# Patient Record
Sex: FEMALE | Race: OTHER | NOT HISPANIC OR LATINO | URBAN - METROPOLITAN AREA
[De-identification: names, ages, dates, MRNs, and addresses within clinical notes are randomized per-mention and may not be internally consistent; named-entity substitution may affect disease eponyms.]

---

## 2017-06-09 ENCOUNTER — OUTPATIENT (OUTPATIENT)
Dept: OUTPATIENT SERVICES | Facility: HOSPITAL | Age: 42
LOS: 1 days | End: 2017-06-09
Payer: COMMERCIAL

## 2017-06-09 ENCOUNTER — APPOINTMENT (OUTPATIENT)
Dept: ULTRASOUND IMAGING | Facility: IMAGING CENTER | Age: 42
End: 2017-06-09

## 2017-06-09 DIAGNOSIS — Z98.51 TUBAL LIGATION STATUS: Chronic | ICD-10-CM

## 2017-06-09 DIAGNOSIS — Z00.8 ENCOUNTER FOR OTHER GENERAL EXAMINATION: ICD-10-CM

## 2017-06-09 PROCEDURE — 76830 TRANSVAGINAL US NON-OB: CPT

## 2018-10-03 ENCOUNTER — RESULT REVIEW (OUTPATIENT)
Age: 43
End: 2018-10-03

## 2018-10-10 ENCOUNTER — OUTPATIENT (OUTPATIENT)
Dept: OUTPATIENT SERVICES | Facility: HOSPITAL | Age: 43
LOS: 1 days | End: 2018-10-10
Payer: COMMERCIAL

## 2018-10-10 ENCOUNTER — APPOINTMENT (OUTPATIENT)
Dept: MAMMOGRAPHY | Facility: IMAGING CENTER | Age: 43
End: 2018-10-10
Payer: COMMERCIAL

## 2018-10-10 DIAGNOSIS — Z98.51 TUBAL LIGATION STATUS: Chronic | ICD-10-CM

## 2018-10-10 DIAGNOSIS — Z00.8 ENCOUNTER FOR OTHER GENERAL EXAMINATION: ICD-10-CM

## 2018-10-10 PROCEDURE — 77063 BREAST TOMOSYNTHESIS BI: CPT

## 2018-10-10 PROCEDURE — 77067 SCR MAMMO BI INCL CAD: CPT

## 2018-10-10 PROCEDURE — 77063 BREAST TOMOSYNTHESIS BI: CPT | Mod: 26

## 2018-10-10 PROCEDURE — 77067 SCR MAMMO BI INCL CAD: CPT | Mod: 26

## 2019-11-27 ENCOUNTER — RESULT REVIEW (OUTPATIENT)
Age: 44
End: 2019-11-27

## 2019-12-26 ENCOUNTER — APPOINTMENT (OUTPATIENT)
Dept: ULTRASOUND IMAGING | Facility: IMAGING CENTER | Age: 44
End: 2019-12-26
Payer: COMMERCIAL

## 2019-12-26 ENCOUNTER — OUTPATIENT (OUTPATIENT)
Dept: OUTPATIENT SERVICES | Facility: HOSPITAL | Age: 44
LOS: 1 days | End: 2019-12-26
Payer: COMMERCIAL

## 2019-12-26 ENCOUNTER — APPOINTMENT (OUTPATIENT)
Dept: MAMMOGRAPHY | Facility: IMAGING CENTER | Age: 44
End: 2019-12-26
Payer: COMMERCIAL

## 2019-12-26 DIAGNOSIS — Z00.8 ENCOUNTER FOR OTHER GENERAL EXAMINATION: ICD-10-CM

## 2019-12-26 DIAGNOSIS — Z98.51 TUBAL LIGATION STATUS: Chronic | ICD-10-CM

## 2019-12-26 PROCEDURE — 77067 SCR MAMMO BI INCL CAD: CPT | Mod: 26

## 2019-12-26 PROCEDURE — 77063 BREAST TOMOSYNTHESIS BI: CPT | Mod: 26

## 2019-12-26 PROCEDURE — 77063 BREAST TOMOSYNTHESIS BI: CPT

## 2019-12-26 PROCEDURE — 76641 ULTRASOUND BREAST COMPLETE: CPT | Mod: 26,50

## 2019-12-26 PROCEDURE — 76641 ULTRASOUND BREAST COMPLETE: CPT

## 2019-12-26 PROCEDURE — 77067 SCR MAMMO BI INCL CAD: CPT

## 2020-06-29 ENCOUNTER — APPOINTMENT (OUTPATIENT)
Dept: ULTRASOUND IMAGING | Facility: IMAGING CENTER | Age: 45
End: 2020-06-29

## 2020-07-09 ENCOUNTER — RESULT REVIEW (OUTPATIENT)
Age: 45
End: 2020-07-09

## 2020-07-09 ENCOUNTER — OUTPATIENT (OUTPATIENT)
Dept: OUTPATIENT SERVICES | Facility: HOSPITAL | Age: 45
LOS: 1 days | End: 2020-07-09
Payer: COMMERCIAL

## 2020-07-09 ENCOUNTER — APPOINTMENT (OUTPATIENT)
Dept: ULTRASOUND IMAGING | Facility: IMAGING CENTER | Age: 45
End: 2020-07-09
Payer: COMMERCIAL

## 2020-07-09 DIAGNOSIS — Z98.51 TUBAL LIGATION STATUS: Chronic | ICD-10-CM

## 2020-07-09 DIAGNOSIS — Z00.8 ENCOUNTER FOR OTHER GENERAL EXAMINATION: ICD-10-CM

## 2020-07-09 PROCEDURE — 76642 ULTRASOUND BREAST LIMITED: CPT | Mod: 26,RT

## 2020-07-09 PROCEDURE — 76642 ULTRASOUND BREAST LIMITED: CPT

## 2021-03-12 ENCOUNTER — RESULT REVIEW (OUTPATIENT)
Age: 46
End: 2021-03-12

## 2021-03-12 ENCOUNTER — APPOINTMENT (OUTPATIENT)
Dept: MAMMOGRAPHY | Facility: IMAGING CENTER | Age: 46
End: 2021-03-12
Payer: COMMERCIAL

## 2021-03-12 ENCOUNTER — TRANSCRIPTION ENCOUNTER (OUTPATIENT)
Age: 46
End: 2021-03-12

## 2021-03-12 ENCOUNTER — OUTPATIENT (OUTPATIENT)
Dept: OUTPATIENT SERVICES | Facility: HOSPITAL | Age: 46
LOS: 1 days | End: 2021-03-12
Payer: COMMERCIAL

## 2021-03-12 DIAGNOSIS — Z98.51 TUBAL LIGATION STATUS: Chronic | ICD-10-CM

## 2021-03-12 DIAGNOSIS — Z00.8 ENCOUNTER FOR OTHER GENERAL EXAMINATION: ICD-10-CM

## 2021-03-12 PROCEDURE — 77067 SCR MAMMO BI INCL CAD: CPT

## 2021-03-12 PROCEDURE — 77063 BREAST TOMOSYNTHESIS BI: CPT

## 2021-03-12 PROCEDURE — 77063 BREAST TOMOSYNTHESIS BI: CPT | Mod: 26

## 2021-03-12 PROCEDURE — 77067 SCR MAMMO BI INCL CAD: CPT | Mod: 26

## 2021-06-09 ENCOUNTER — APPOINTMENT (OUTPATIENT)
Dept: MRI IMAGING | Facility: IMAGING CENTER | Age: 46
End: 2021-06-09

## 2022-02-28 ENCOUNTER — EMERGENCY (EMERGENCY)
Facility: HOSPITAL | Age: 47
LOS: 1 days | Discharge: HOME | End: 2022-02-28
Attending: EMERGENCY MEDICINE | Admitting: EMERGENCY MEDICINE
Payer: COMMERCIAL

## 2022-02-28 VITALS
RESPIRATION RATE: 18 BRPM | HEART RATE: 126 BPM | TEMPERATURE: 98 F | OXYGEN SATURATION: 100 % | SYSTOLIC BLOOD PRESSURE: 138 MMHG | HEIGHT: 63 IN | DIASTOLIC BLOOD PRESSURE: 81 MMHG

## 2022-02-28 VITALS
RESPIRATION RATE: 18 BRPM | SYSTOLIC BLOOD PRESSURE: 130 MMHG | DIASTOLIC BLOOD PRESSURE: 83 MMHG | OXYGEN SATURATION: 100 % | HEART RATE: 104 BPM

## 2022-02-28 DIAGNOSIS — Z98.51 TUBAL LIGATION STATUS: Chronic | ICD-10-CM

## 2022-02-28 LAB
ALBUMIN SERPL ELPH-MCNC: 4.3 G/DL — SIGNIFICANT CHANGE UP (ref 3.3–5)
ALP SERPL-CCNC: 117 U/L — SIGNIFICANT CHANGE UP (ref 40–120)
ALT FLD-CCNC: 17 U/L — SIGNIFICANT CHANGE UP (ref 4–33)
ANION GAP SERPL CALC-SCNC: 10 MMOL/L — SIGNIFICANT CHANGE UP (ref 7–14)
AST SERPL-CCNC: 23 U/L — SIGNIFICANT CHANGE UP (ref 4–32)
BASOPHILS # BLD AUTO: 0.03 K/UL — SIGNIFICANT CHANGE UP (ref 0–0.2)
BASOPHILS NFR BLD AUTO: 0.6 % — SIGNIFICANT CHANGE UP (ref 0–2)
BILIRUB SERPL-MCNC: 0.2 MG/DL — SIGNIFICANT CHANGE UP (ref 0.2–1.2)
BLD GP AB SCN SERPL QL: NEGATIVE — SIGNIFICANT CHANGE UP
BUN SERPL-MCNC: 18 MG/DL — SIGNIFICANT CHANGE UP (ref 7–23)
CALCIUM SERPL-MCNC: 9.1 MG/DL — SIGNIFICANT CHANGE UP (ref 8.4–10.5)
CHLORIDE SERPL-SCNC: 103 MMOL/L — SIGNIFICANT CHANGE UP (ref 98–107)
CO2 SERPL-SCNC: 24 MMOL/L — SIGNIFICANT CHANGE UP (ref 22–31)
CREAT SERPL-MCNC: 0.65 MG/DL — SIGNIFICANT CHANGE UP (ref 0.5–1.3)
EGFR: 111 ML/MIN/1.73M2 — SIGNIFICANT CHANGE UP
EOSINOPHIL # BLD AUTO: 0.06 K/UL — SIGNIFICANT CHANGE UP (ref 0–0.5)
EOSINOPHIL NFR BLD AUTO: 1.3 % — SIGNIFICANT CHANGE UP (ref 0–6)
GLUCOSE SERPL-MCNC: 94 MG/DL — SIGNIFICANT CHANGE UP (ref 70–99)
HCT VFR BLD CALC: 24.5 % — LOW (ref 34.5–45)
HGB BLD-MCNC: 8.1 G/DL — LOW (ref 11.5–15.5)
IANC: 3.12 K/UL — SIGNIFICANT CHANGE UP (ref 1.5–8.5)
IMM GRANULOCYTES NFR BLD AUTO: 0.8 % — SIGNIFICANT CHANGE UP (ref 0–1.5)
LYMPHOCYTES # BLD AUTO: 1.18 K/UL — SIGNIFICANT CHANGE UP (ref 1–3.3)
LYMPHOCYTES # BLD AUTO: 24.9 % — SIGNIFICANT CHANGE UP (ref 13–44)
MCHC RBC-ENTMCNC: 28.9 PG — SIGNIFICANT CHANGE UP (ref 27–34)
MCHC RBC-ENTMCNC: 33.1 GM/DL — SIGNIFICANT CHANGE UP (ref 32–36)
MCV RBC AUTO: 87.5 FL — SIGNIFICANT CHANGE UP (ref 80–100)
MONOCYTES # BLD AUTO: 0.31 K/UL — SIGNIFICANT CHANGE UP (ref 0–0.9)
MONOCYTES NFR BLD AUTO: 6.5 % — SIGNIFICANT CHANGE UP (ref 2–14)
NEUTROPHILS # BLD AUTO: 3.12 K/UL — SIGNIFICANT CHANGE UP (ref 1.8–7.4)
NEUTROPHILS NFR BLD AUTO: 65.9 % — SIGNIFICANT CHANGE UP (ref 43–77)
NRBC # BLD: 0 /100 WBCS — SIGNIFICANT CHANGE UP
NRBC # FLD: 0 K/UL — SIGNIFICANT CHANGE UP
PLATELET # BLD AUTO: 252 K/UL — SIGNIFICANT CHANGE UP (ref 150–400)
POTASSIUM SERPL-MCNC: 3.7 MMOL/L — SIGNIFICANT CHANGE UP (ref 3.5–5.3)
POTASSIUM SERPL-SCNC: 3.7 MMOL/L — SIGNIFICANT CHANGE UP (ref 3.5–5.3)
PROT SERPL-MCNC: 7.5 G/DL — SIGNIFICANT CHANGE UP (ref 6–8.3)
RBC # BLD: 2.8 M/UL — LOW (ref 3.8–5.2)
RBC # FLD: 13.9 % — SIGNIFICANT CHANGE UP (ref 10.3–14.5)
RH IG SCN BLD-IMP: NEGATIVE — SIGNIFICANT CHANGE UP
SODIUM SERPL-SCNC: 137 MMOL/L — SIGNIFICANT CHANGE UP (ref 135–145)
WBC # BLD: 4.74 K/UL — SIGNIFICANT CHANGE UP (ref 3.8–10.5)
WBC # FLD AUTO: 4.74 K/UL — SIGNIFICANT CHANGE UP (ref 3.8–10.5)

## 2022-02-28 PROCEDURE — 99285 EMERGENCY DEPT VISIT HI MDM: CPT

## 2022-02-28 PROCEDURE — 76830 TRANSVAGINAL US NON-OB: CPT | Mod: 26

## 2022-02-28 NOTE — ED ADULT NURSE NOTE - OBJECTIVE STATEMENT
ER pt AOX 3 ambulatory to Er coming with long menses pt stated had vag. bleeding since her LMP 2/14/22 denies CP, dizziness, no SOB, no ABD cramping, pt stated never had blood transfusions in the plast, IV to right AC 20g angio cath place, labs sent, pending U/S.     Shira Carreno RN

## 2022-02-28 NOTE — ED PROVIDER NOTE - NSICDXPASTSURGICALHX_GEN_ALL_CORE_FT
PAST SURGICAL HISTORY:   delivery delivered 2007    S/P tubal ligation bilateral tubal ligation

## 2022-02-28 NOTE — ED PROVIDER NOTE - NSICDXFAMILYHX_GEN_ALL_CORE_FT
FAMILY HISTORY:  Mother  Still living? Yes, Estimated age: Age Unknown  Family history of breast cancer, Age at diagnosis: Age Unknown  Family history of diabetes mellitus, Age at diagnosis: Age Unknown

## 2022-02-28 NOTE — ED PROVIDER NOTE - OBJECTIVE STATEMENT
47 y/o F with PMH of Fe deficiency anemia presents with heavy vaginal bleeding with clots x 15d. Endorses heavy bleeding the past 48 hours, but notes it had subsided this morning. Pt's LMP was 2/14 and is typically regular and lasts 5 days, with 1st 2 days heavy and next 3 light. Pt reports she had a hx of similar complaint of vaginal bleeding in 2016, had hysteroscopy with D&C, notes no abnormalities found. Denies hx of transfusions. Also reports fatigue which is typical for her d/t anemia. Pt denies fever, chills, skin changes, CP, SOB, nausea, vomiting, diarrhea, abdominal pain, dysuria, or vaginal discharge.

## 2022-02-28 NOTE — ED PROVIDER NOTE - NSFOLLOWUPINSTRUCTIONS_ED_ALL_ED_FT
Rest, stay hydrated, take all meds as previously prescribed, Followup with your OBGYN within 2 days for post hospital visit. Show your doctor and specialists all copies of labs given to you. Return for worsening symptoms, ex. fever, shortness of breath, chest pain, dizziness, palpitations, excessive vaginal bleeding (soaking 1 pad/hr) etc. Please read all the patient handouts.

## 2022-02-28 NOTE — ED PROVIDER NOTE - ATTENDING CONTRIBUTION TO CARE
agree with PA note    "45 y/o F with PMH of Fe deficiency anemia presents with heavy vaginal bleeding with clots x 15d. Endorses heavy bleeding the past 48 hours, but notes it had subsided this morning. Pt's LMP was 2/14 and is typically regular and lasts 5 days, with 1st 2 days heavy and next 3 light. Pt reports she had a hx of similar complaint of vaginal bleeding in 2016, had hysteroscopy with D&C, notes no abnormalities found. Denies hx of transfusions. Also reports fatigue which is typical for her d/t anemia. Pt denies fever, chills, skin changes, CP, SOB, nausea, vomiting, diarrhea, abdominal pain, dysuria, or vaginal discharge."    PE:  well appearing; VSS; CTAB/L; s1 s2 no m/r/g abd soft/NT/ND ext: no edema GYN: minimal bleeding    Imp: DUB, labs, type, and US, reassess

## 2022-02-28 NOTE — ED PROVIDER NOTE - NSICDXPASTMEDICALHX_GEN_ALL_CORE_FT
PAST MEDICAL HISTORY:  Anemia     Heavy menstrual bleeding     History of  delivery x3    Short cervix affecting pregnancy treated with progesterone until week 34 of this pregnancy    Tubal ligation status bilateral tubal ligation 2014

## 2022-02-28 NOTE — ED PROVIDER NOTE - PATIENT PORTAL LINK FT
You can access the FollowMyHealth Patient Portal offered by [facility] by registering at the following website: [email or web address]. By joining [employer]’s FollowMyHealth portal, you will also be able to view your health information using other applications (apps) compatible with our system.

## 2022-04-29 ENCOUNTER — RESULT REVIEW (OUTPATIENT)
Age: 47
End: 2022-04-29

## 2022-05-06 ENCOUNTER — EMERGENCY (EMERGENCY)
Facility: HOSPITAL | Age: 47
LOS: 1 days | Discharge: HOME | End: 2022-05-06
Attending: EMERGENCY MEDICINE | Admitting: EMERGENCY MEDICINE
Payer: COMMERCIAL

## 2022-05-06 VITALS
DIASTOLIC BLOOD PRESSURE: 87 MMHG | HEART RATE: 107 BPM | TEMPERATURE: 98 F | OXYGEN SATURATION: 100 % | HEIGHT: 63 IN | SYSTOLIC BLOOD PRESSURE: 151 MMHG | RESPIRATION RATE: 16 BRPM

## 2022-05-06 VITALS
DIASTOLIC BLOOD PRESSURE: 63 MMHG | TEMPERATURE: 98 F | SYSTOLIC BLOOD PRESSURE: 120 MMHG | HEART RATE: 83 BPM | OXYGEN SATURATION: 100 % | RESPIRATION RATE: 17 BRPM

## 2022-05-06 DIAGNOSIS — Z98.51 TUBAL LIGATION STATUS: Chronic | ICD-10-CM

## 2022-05-06 PROCEDURE — G1004: CPT

## 2022-05-06 PROCEDURE — 73562 X-RAY EXAM OF KNEE 3: CPT | Mod: 26,LT

## 2022-05-06 PROCEDURE — 99285 EMERGENCY DEPT VISIT HI MDM: CPT

## 2022-05-06 PROCEDURE — 73590 X-RAY EXAM OF LOWER LEG: CPT | Mod: 26,LT

## 2022-05-06 PROCEDURE — 72125 CT NECK SPINE W/O DYE: CPT | Mod: 26,MG

## 2022-05-06 RX ORDER — ACETAMINOPHEN 500 MG
650 TABLET ORAL ONCE
Refills: 0 | Status: COMPLETED | OUTPATIENT
Start: 2022-05-06 | End: 2022-05-06

## 2022-05-06 RX ORDER — CYCLOBENZAPRINE HYDROCHLORIDE 10 MG/1
5 TABLET, FILM COATED ORAL ONCE
Refills: 0 | Status: COMPLETED | OUTPATIENT
Start: 2022-05-06 | End: 2022-05-06

## 2022-05-06 RX ORDER — CYCLOBENZAPRINE HYDROCHLORIDE 10 MG/1
1 TABLET, FILM COATED ORAL
Qty: 9 | Refills: 0
Start: 2022-05-06 | End: 2022-05-08

## 2022-05-06 RX ORDER — IBUPROFEN 200 MG
600 TABLET ORAL ONCE
Refills: 0 | Status: COMPLETED | OUTPATIENT
Start: 2022-05-06 | End: 2022-05-06

## 2022-05-06 RX ADMIN — Medication 600 MILLIGRAM(S): at 10:41

## 2022-05-06 RX ADMIN — CYCLOBENZAPRINE HYDROCHLORIDE 5 MILLIGRAM(S): 10 TABLET, FILM COATED ORAL at 10:40

## 2022-05-06 RX ADMIN — Medication 650 MILLIGRAM(S): at 10:40

## 2022-05-06 NOTE — ED PROVIDER NOTE - NS ED ATTENDING STATEMENT MOD
This was a shared visit with the FATOU. I reviewed and verified the documentation and independently performed the documented:
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
## 2022-05-06 NOTE — ED PROVIDER NOTE - PROGRESS NOTE DETAILS
RAFA FERGUSON:  Pt reports feeling better.  Radiology exams resulted; non-actionable.  Pt medically stable for discharge.  Strict return precautions given.  Pt to follow up with PMD.  Reassessment performed and plan for discharge discussed with Dr. Flores who agrees with disposition and discharge plan.
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
## 2022-05-06 NOTE — ED PROVIDER NOTE - ATTENDING APP SHARED VISIT CONTRIBUTION OF CARE
I performed a history and physical exam of the patient and discussed their management with the resident and /or advanced care provider. I reviewed the resident and /or ACP's note and agree with the documented findings and plan of care. My medical decision making and observations are found above.  Lungs clear, + spine pain, lt hip with nl rom. nl distal pulses.

## 2022-05-06 NOTE — ED PROVIDER NOTE - PATIENT PORTAL LINK FT
You can access the FollowMyHealth Patient Portal offered by [facility] by registering at the following website: [email or web address]. By joining [employer]’s FollowMyHealth portal, you will also be able to view your health information using other applications (apps) compatible with our system.

## 2022-05-06 NOTE — ED PROVIDER NOTE - PHYSICAL EXAMINATION
-Cranial Nerves:  --CN II: PERRLA  --CN III, IV, VI: EOMI b/l  --CN V1-3: Facial sensation intact to touch  --CN VII: No facial asymmetry or droop  --CN VIII: Hearing intact to rubbing fingers b/l  --CN IX, X: Palate elevates symmetrically. Normal phonation  --CN XI: Heading turning and shoulder shrug intact b/l  --CN XII: Tongue midline with normal movements    +bruising at left midshin; no palpable bony deformity; soft compartments    LLE:  5/5 strength; sensation intact to light touch; < 2 sec capillary refill    2+ pulses bilaterally

## 2022-05-06 NOTE — ED PROVIDER NOTE - NSFOLLOWUPINSTRUCTIONS_ED_ALL_ED_FT
Advance activity as tolerated.  Continue all previously prescribed medications as directed unless otherwise instructed.  Take Motrin (also sold as Advil or Ibuprofen) 400-600 mg (two or three 200 mg over the counter pills) every 8 hours as needed for moderate pain or fevers-- take with food. Take Tylenol 650mg (Two 325 mg pills) every 4-6 hours as needed for pain or fevers.  Take Flexeril 5 mg every 8 hours as needed for muscle spasm -- causes drowsiness; no drinking alcohol or driving with this medication.  Apply lidocaine patch to affected area; this is available over the counter, follow instructions on its packaging.  Follow up with your primary care physician in 48-72 hours- bring copies of your results.  Return to the ER for worsening or persistent symptoms, including but not limited to worsening/persistent headache, changes in vision/hearing, dizziness, lightheadedness, pain, numbness, weakness, difficulty urinating, difficulty passing bowel movements, incontinence, difficulty walking, falls, abdominal pain, chest pain, fevers, and/or ANY NEW OR CONCERNING SYMPTOMS. If you have issues obtaining follow up, please call: [phone] to obtain a doctor or specialist who takes your insurance in your area.  You may call [phone] to make an appointment with the internal medicine clinic.

## 2022-05-13 ENCOUNTER — APPOINTMENT (OUTPATIENT)
Dept: MAMMOGRAPHY | Facility: IMAGING CENTER | Age: 47
End: 2022-05-13
Payer: COMMERCIAL

## 2022-05-13 ENCOUNTER — OUTPATIENT (OUTPATIENT)
Dept: OUTPATIENT SERVICES | Facility: HOSPITAL | Age: 47
LOS: 1 days | End: 2022-05-13
Payer: COMMERCIAL

## 2022-05-13 ENCOUNTER — APPOINTMENT (OUTPATIENT)
Dept: ULTRASOUND IMAGING | Facility: IMAGING CENTER | Age: 47
End: 2022-05-13
Payer: COMMERCIAL

## 2022-05-13 DIAGNOSIS — Z00.8 ENCOUNTER FOR OTHER GENERAL EXAMINATION: ICD-10-CM

## 2022-05-13 DIAGNOSIS — Z98.51 TUBAL LIGATION STATUS: Chronic | ICD-10-CM

## 2022-05-13 PROCEDURE — 76641 ULTRASOUND BREAST COMPLETE: CPT | Mod: 26,50

## 2022-05-13 PROCEDURE — 76641 ULTRASOUND BREAST COMPLETE: CPT

## 2022-05-13 PROCEDURE — 77067 SCR MAMMO BI INCL CAD: CPT | Mod: 26

## 2022-05-13 PROCEDURE — 77063 BREAST TOMOSYNTHESIS BI: CPT

## 2022-05-13 PROCEDURE — 77067 SCR MAMMO BI INCL CAD: CPT

## 2022-05-13 PROCEDURE — 77063 BREAST TOMOSYNTHESIS BI: CPT | Mod: 26

## 2022-11-04 ENCOUNTER — OUTPATIENT (OUTPATIENT)
Dept: OUTPATIENT SERVICES | Facility: HOSPITAL | Age: 47
LOS: 1 days | End: 2022-11-04
Payer: COMMERCIAL

## 2022-11-04 ENCOUNTER — APPOINTMENT (OUTPATIENT)
Dept: MRI IMAGING | Facility: IMAGING CENTER | Age: 47
End: 2022-11-04

## 2022-11-04 ENCOUNTER — RESULT REVIEW (OUTPATIENT)
Age: 47
End: 2022-11-04

## 2022-11-04 DIAGNOSIS — Z98.51 TUBAL LIGATION STATUS: Chronic | ICD-10-CM

## 2022-11-04 DIAGNOSIS — Z00.8 ENCOUNTER FOR OTHER GENERAL EXAMINATION: ICD-10-CM

## 2022-11-04 PROCEDURE — C8908: CPT

## 2022-11-04 PROCEDURE — 77049 MRI BREAST C-+ W/CAD BI: CPT | Mod: 26

## 2022-11-04 PROCEDURE — A9585: CPT

## 2022-11-04 PROCEDURE — C8937: CPT

## 2023-03-01 ENCOUNTER — RESULT REVIEW (OUTPATIENT)
Age: 48
End: 2023-03-01

## 2023-05-22 ENCOUNTER — NON-APPOINTMENT (OUTPATIENT)
Age: 48
End: 2023-05-22

## 2023-05-24 ENCOUNTER — NON-APPOINTMENT (OUTPATIENT)
Age: 48
End: 2023-05-24

## 2023-05-24 ENCOUNTER — APPOINTMENT (OUTPATIENT)
Dept: INTERNAL MEDICINE | Facility: CLINIC | Age: 48
End: 2023-05-24
Payer: COMMERCIAL

## 2023-05-24 VITALS
DIASTOLIC BLOOD PRESSURE: 85 MMHG | BODY MASS INDEX: 25.12 KG/M2 | SYSTOLIC BLOOD PRESSURE: 147 MMHG | WEIGHT: 140 LBS | HEART RATE: 87 BPM | HEIGHT: 62.5 IN

## 2023-05-24 DIAGNOSIS — Z83.438 FAMILY HISTORY OF OTHER DISORDER OF LIPOPROTEIN METABOLISM AND OTHER LIPIDEMIA: ICD-10-CM

## 2023-05-24 DIAGNOSIS — Z80.3 FAMILY HISTORY OF MALIGNANT NEOPLASM OF BREAST: ICD-10-CM

## 2023-05-24 DIAGNOSIS — Z83.3 FAMILY HISTORY OF DIABETES MELLITUS: ICD-10-CM

## 2023-05-24 DIAGNOSIS — Z78.9 OTHER SPECIFIED HEALTH STATUS: ICD-10-CM

## 2023-05-24 DIAGNOSIS — N94.6 DYSMENORRHEA, UNSPECIFIED: ICD-10-CM

## 2023-05-24 DIAGNOSIS — D50.9 IRON DEFICIENCY ANEMIA, UNSPECIFIED: ICD-10-CM

## 2023-05-24 PROCEDURE — 99386 PREV VISIT NEW AGE 40-64: CPT | Mod: 25

## 2023-05-24 PROCEDURE — 36415 COLL VENOUS BLD VENIPUNCTURE: CPT

## 2023-05-24 PROCEDURE — 93000 ELECTROCARDIOGRAM COMPLETE: CPT | Mod: 59

## 2023-05-24 NOTE — HEALTH RISK ASSESSMENT
[Good] : ~his/her~  mood as  good [Never] : Never [MammogramDate] : 2023 [PapSmearDate] : 2023 [ColonoscopyDate] : never

## 2023-05-24 NOTE — ASSESSMENT
[FreeTextEntry1] : 47 year old female with history of iron deficiency anemia, dysmenorrhea on OCP presents for initial annual exam.\par \par iron deficiency anemia\par -Continue iron tabs as directed\par \par Dysmenorrhea\par -Continue oral contraceptive\par -Follow-up as per GYN\par \par Healthcare Maintenance\par -Advise Yearly Skin cancer screening with Dermatologist \par -Advise Yearly Eye exam with Ophthalmologist\par -Advise Yearly Dental exam\par -Educated of the importance of Healthy diet, such as Mediterranean Diet and Exercise, such as walking >20 minutes a day and increasing gradually as tolerated\par \par Immunizations\par -Flu vaccine -up to date \par -Covid vaccine -up to date\par \par Preventative screening \par -advised to get PAP for cervical cancer screening -up to date \par -advised to get mammogram for breast cancer screening -up to date \par -advised to get colonoscopy for colon cancer screening -referral given \par \par \par

## 2023-05-24 NOTE — HISTORY OF PRESENT ILLNESS
[FreeTextEntry1] : physical examination  [de-identified] : 47 year old female with history of iron deficiency anemia, dysmenorrhea on OCP presents for initial annual exam.\par \par diet- home cooking mostly.  fruits/vegs.  2 soda per week\par exercise- trendmill, walking \par \par \par \par , 3 children \par homemaker \par non-smoker

## 2023-05-26 ENCOUNTER — CLINICAL ADVICE (OUTPATIENT)
Age: 48
End: 2023-05-26

## 2023-05-26 LAB
ALBUMIN SERPL ELPH-MCNC: 4.7 G/DL
ALP BLD-CCNC: 110 U/L
ALT SERPL-CCNC: 15 U/L
AMPHET UR-MCNC: NEGATIVE NG/ML
ANION GAP SERPL CALC-SCNC: 13 MMOL/L
APPEARANCE: CLEAR
AST SERPL-CCNC: 20 U/L
BACTERIA: NEGATIVE /HPF
BARBITURATES UR-MCNC: NEGATIVE NG/ML
BENZODIAZ UR-MCNC: NEGATIVE NG/ML
BILIRUB DIRECT SERPL-MCNC: 0.1 MG/DL
BILIRUB INDIRECT SERPL-MCNC: 0.3 MG/DL
BILIRUB SERPL-MCNC: 0.4 MG/DL
BILIRUBIN URINE: NEGATIVE
BLOOD URINE: NEGATIVE
BUN SERPL-MCNC: 16 MG/DL
CALCIUM SERPL-MCNC: 9.7 MG/DL
CAST: 0 /LPF
CHLORIDE SERPL-SCNC: 102 MMOL/L
CHOLEST SERPL-MCNC: 206 MG/DL
CO2 SERPL-SCNC: 23 MMOL/L
COCAINE METAB.OTHER UR-MCNC: NEGATIVE NG/ML
COLOR: YELLOW
CREAT SERPL-MCNC: 0.68 MG/DL
CREATININE, URINE: 120.3 MG/DL
EGFR: 109 ML/MIN/1.73M2
EPITHELIAL CELLS: 10 /HPF
ESTIMATED AVERAGE GLUCOSE: 100 MG/DL
FENTANYL, URINE: NEGATIVE NG/ML
FERRITIN SERPL-MCNC: 41 NG/ML
FOLATE SERPL-MCNC: 16.3 NG/ML
GLUCOSE QUALITATIVE U: NEGATIVE MG/DL
GLUCOSE SERPL-MCNC: 87 MG/DL
HBA1C MFR BLD HPLC: 5.1 %
HCV AB SER QL: NONREACTIVE
HCV S/CO RATIO: 0.15 S/CO
HDLC SERPL-MCNC: 61 MG/DL
HIV1+2 AB SPEC QL IA.RAPID: NONREACTIVE
IRON SATN MFR SERPL: 8 %
IRON SERPL-MCNC: 33 UG/DL
KETONES URINE: NEGATIVE MG/DL
LDLC SERPL CALC-MCNC: 132 MG/DL
LEUKOCYTE ESTERASE URINE: NEGATIVE
METHADONE UR-MCNC: NEGATIVE NG/ML
MEV IGG FLD QL IA: >300 AU/ML
MEV IGG+IGM SER-IMP: POSITIVE
MICROSCOPIC-UA: NORMAL
MUV AB SER-ACNC: POSITIVE
MUV IGG SER QL IA: 28.9 AU/ML
NITRITE URINE: NEGATIVE
NONHDLC SERPL-MCNC: 145 MG/DL
OPIATES UR-MCNC: NEGATIVE NG/ML
OXYCODONE/OXYMORPHONE, URINE: NEGATIVE NG/ML
PCP UR-MCNC: NEGATIVE NG/ML
PH URINE: 5.5
PH, URINE: 5.4
PLEASE NOTE: DRUGSCRUR: NORMAL
POTASSIUM SERPL-SCNC: 4.1 MMOL/L
PROT SERPL-MCNC: 8.1 G/DL
PROTEIN URINE: NEGATIVE MG/DL
RED BLOOD CELLS URINE: 2 /HPF
RUBV IGG FLD-ACNC: 12.3 INDEX
RUBV IGG SER-IMP: POSITIVE
SODIUM SERPL-SCNC: 138 MMOL/L
SPECIFIC GRAVITY URINE: 1.02
THC UR QL: NEGATIVE NG/ML
TIBC SERPL-MCNC: 385 UG/DL
TRIGL SERPL-MCNC: 66 MG/DL
TSH SERPL-ACNC: 1.72 UIU/ML
UIBC SERPL-MCNC: 352 UG/DL
UROBILINOGEN URINE: 0.2 MG/DL
VIT B12 SERPL-MCNC: 419 PG/ML
WHITE BLOOD CELLS URINE: 0 /HPF

## 2023-05-30 ENCOUNTER — CLINICAL ADVICE (OUTPATIENT)
Age: 48
End: 2023-05-30

## 2023-05-30 LAB
M TB IFN-G BLD-IMP: NEGATIVE
QUANTIFERON TB PLUS MITOGEN MINUS NIL: 1.3 IU/ML
QUANTIFERON TB PLUS NIL: 0.03 IU/ML
QUANTIFERON TB PLUS TB1 MINUS NIL: 0 IU/ML
QUANTIFERON TB PLUS TB2 MINUS NIL: -0.01 IU/ML

## 2023-07-14 ENCOUNTER — APPOINTMENT (OUTPATIENT)
Dept: ULTRASOUND IMAGING | Facility: IMAGING CENTER | Age: 48
End: 2023-07-14
Payer: COMMERCIAL

## 2023-07-14 ENCOUNTER — RESULT REVIEW (OUTPATIENT)
Age: 48
End: 2023-07-14

## 2023-07-14 ENCOUNTER — APPOINTMENT (OUTPATIENT)
Dept: MAMMOGRAPHY | Facility: IMAGING CENTER | Age: 48
End: 2023-07-14
Payer: COMMERCIAL

## 2023-07-14 ENCOUNTER — OUTPATIENT (OUTPATIENT)
Dept: OUTPATIENT SERVICES | Facility: HOSPITAL | Age: 48
LOS: 1 days | End: 2023-07-14
Payer: COMMERCIAL

## 2023-07-14 DIAGNOSIS — Z00.8 ENCOUNTER FOR OTHER GENERAL EXAMINATION: ICD-10-CM

## 2023-07-14 DIAGNOSIS — Z98.51 TUBAL LIGATION STATUS: Chronic | ICD-10-CM

## 2023-07-14 PROCEDURE — 77067 SCR MAMMO BI INCL CAD: CPT

## 2023-07-14 PROCEDURE — 77067 SCR MAMMO BI INCL CAD: CPT | Mod: 26

## 2023-07-14 PROCEDURE — 77063 BREAST TOMOSYNTHESIS BI: CPT

## 2023-07-14 PROCEDURE — 77063 BREAST TOMOSYNTHESIS BI: CPT | Mod: 26

## 2023-07-14 PROCEDURE — 76641 ULTRASOUND BREAST COMPLETE: CPT

## 2023-07-14 PROCEDURE — 76641 ULTRASOUND BREAST COMPLETE: CPT | Mod: 26,50

## 2023-08-01 NOTE — ED PROVIDER NOTE - ABDOMINAL EXAM
Case Management Assessment  Initial Evaluation    Date/Time of Evaluation: 8/1/2023 3:43 PM  Assessment Completed by: Boaz Gresham RN    If patient is discharged prior to next notation, then this note serves as note for discharge by case management. Patient Name: Rasta Coronado                   YOB: 1958  Diagnosis: Cellulitis and abscess of trunk [L03.319, L02.219]  Dehydration [E86.0]  Cellulitis [L03.90]  Elevated serum lactate dehydrogenase [R74.02]  Increased ileostomy output ([address]) [R19.8, Z93.2]                   Date / Time: 7/31/2023  3:17 PM    Patient Admission Status: Inpatient   Readmission Risk (Low < 19, Mod (19-27), High > 27): Readmission Risk Score: 31.1    Current PCP: VASU Galeano  PCP verified by CM? Yes    Chart Reviewed: Yes      History Provided by: Patient  Patient Orientation: Alert and Oriented    Patient Cognition: Alert    Hospitalization in the last 30 days (Readmission):  Yes    If yes, Readmission Assessment in CM Navigator will be completed. Advance Directives:      Code Status: Full Code   Patient's Primary Decision Maker is: Named in Leora Baum    Primary Decision Maker: Lashae Min - [facility] - [phone]    Discharge Planning:    Patient lives with: Alone Type of Home: House  Primary Care Giver: Self  Patient Support Systems include: Family Members   Current Financial resources: Medicare  Current community resources: None  Current services prior to admission: Durable Medical Equipment            Current DME: Erwin Gonzales            Type of Home Care services:  None    ADLS  Prior functional level: Independent in ADLs/IADLs  Current functional level: Independent in ADLs/IADLs    PT AM-PAC:   /24  OT AM-PAC:   /24    Family can provide assistance at DC: Yes  Would you like Case Management to discuss the discharge plan with any other family members/significant others, and if so, who?  Yes  Plans to Return to Present Housing: Yes  Other Identified Issues/Barriers to RETURNING to current housing: no  Potential Assistance needed at discharge: N/A            Potential DME:    Patient expects to discharge to: [address] [facility] for transportation at discharge: Family    Financial    Payor: Fabian Jones / Plan: Sami BENJAMIN MED ADV / Product Type: *No Product type* /     Does insurance require precert for SNF: Yes    Potential assistance Purchasing Medications: No  Meds-to-Beds request:        [facility] [id number] Morgan Crowe, [address] [place] [address] [phone] - F [phone]  [address] [facility]  [address] [facility] [address]  Phone: [phone] Fax: [phone] [address] [phone] [facility] [phone]  [address] [facility] [address]  Phone: [phone] Fax: [phone]      Notes:    Factors facilitating achievement of predicted outcomes: Family support    Barriers to discharge: Medical complications    Additional Case Management Notes: 8/1/23 Fabian Jones / [facility] Medicare  PT is from home alone in a one story home DME cane and walker VNS denies Plan is to discharge to home with no needs will continue to follow .//tv    The Plan for Transition of Care is related to the following treatment goals of Cellulitis and abscess of trunk [L03.319, L02.219]  Dehydration [E86.0]  Cellulitis [L03.90]  Elevated serum lactate dehydrogenase [R74.02]  Increased ileostomy output (HCC) [R19.8, Z36.4]    IF APPLICABLE: The Patient and/or patient representative Selene Curtis and his family were provided with a choice of provider and agrees with the discharge plan. Freedom of choice list with basic dialogue that supports the patient's individualized plan of care/goals and shares the quality data associated with the providers was provided to: Patient   Patient Representative Name:       The Patient and/or Patient Representative Agree with the Discharge Plan?  Yes    Arnoldo Baumann RN  Case Management Department  Ph: [phone] Fax: no guarding, no rigidity/soft/nontender.../nondistended/no organomegaly/no pulsating masses

## 2023-09-18 ENCOUNTER — NON-APPOINTMENT (OUTPATIENT)
Age: 48
End: 2023-09-18

## 2023-09-19 ENCOUNTER — APPOINTMENT (OUTPATIENT)
Dept: GASTROENTEROLOGY | Facility: CLINIC | Age: 48
End: 2023-09-19
Payer: COMMERCIAL

## 2023-09-19 ENCOUNTER — RESULT REVIEW (OUTPATIENT)
Age: 48
End: 2023-09-19

## 2023-09-19 VITALS
WEIGHT: 138 LBS | TEMPERATURE: 97.7 F | BODY MASS INDEX: 24.76 KG/M2 | SYSTOLIC BLOOD PRESSURE: 118 MMHG | HEIGHT: 62.5 IN | DIASTOLIC BLOOD PRESSURE: 80 MMHG

## 2023-09-19 DIAGNOSIS — R10.2 PELVIC AND PERINEAL PAIN: ICD-10-CM

## 2023-09-19 DIAGNOSIS — Z78.9 OTHER SPECIFIED HEALTH STATUS: ICD-10-CM

## 2023-09-19 DIAGNOSIS — K62.89 OTHER SPECIFIED DISEASES OF ANUS AND RECTUM: ICD-10-CM

## 2023-09-19 PROCEDURE — 99204 OFFICE O/P NEW MOD 45 MIN: CPT

## 2023-09-19 RX ORDER — SODIUM PICOSULFATE, MAGNESIUM OXIDE, AND ANHYDROUS CITRIC ACID 12; 3.5; 1 G/175ML; G/175ML; MG/175ML
10-3.5-12 MG-GM LIQUID ORAL
Qty: 1 | Refills: 0 | Status: ACTIVE | COMMUNITY
Start: 2023-09-19 | End: 1900-01-01

## 2023-09-19 RX ORDER — FERROUS SULFATE 137(45) MG
TABLET, EXTENDED RELEASE ORAL
Refills: 0 | Status: ACTIVE | COMMUNITY

## 2023-09-28 ENCOUNTER — APPOINTMENT (OUTPATIENT)
Dept: CT IMAGING | Facility: IMAGING CENTER | Age: 48
End: 2023-09-28
Payer: COMMERCIAL

## 2023-09-28 ENCOUNTER — OUTPATIENT (OUTPATIENT)
Dept: OUTPATIENT SERVICES | Facility: HOSPITAL | Age: 48
LOS: 1 days | End: 2023-09-28
Payer: COMMERCIAL

## 2023-09-28 DIAGNOSIS — Z98.51 TUBAL LIGATION STATUS: Chronic | ICD-10-CM

## 2023-09-28 DIAGNOSIS — R10.2 PELVIC AND PERINEAL PAIN: ICD-10-CM

## 2023-09-28 PROCEDURE — 74177 CT ABD & PELVIS W/CONTRAST: CPT | Mod: 26

## 2023-09-28 PROCEDURE — 74177 CT ABD & PELVIS W/CONTRAST: CPT

## 2023-12-22 ENCOUNTER — APPOINTMENT (OUTPATIENT)
Dept: GASTROENTEROLOGY | Facility: AMBULATORY MEDICAL SERVICES | Age: 48
End: 2023-12-22

## 2024-05-22 ENCOUNTER — NON-APPOINTMENT (OUTPATIENT)
Age: 49
End: 2024-05-22

## 2024-05-22 ENCOUNTER — APPOINTMENT (OUTPATIENT)
Dept: INTERNAL MEDICINE | Facility: CLINIC | Age: 49
End: 2024-05-22
Payer: COMMERCIAL

## 2024-05-22 VITALS
HEART RATE: 81 BPM | HEIGHT: 63 IN | WEIGHT: 141.25 LBS | BODY MASS INDEX: 25.03 KG/M2 | OXYGEN SATURATION: 99 % | SYSTOLIC BLOOD PRESSURE: 138 MMHG | DIASTOLIC BLOOD PRESSURE: 83 MMHG

## 2024-05-22 DIAGNOSIS — Z00.00 ENCOUNTER FOR GENERAL ADULT MEDICAL EXAMINATION W/OUT ABNORMAL FINDINGS: ICD-10-CM

## 2024-05-22 DIAGNOSIS — E04.9 NONTOXIC GOITER, UNSPECIFIED: ICD-10-CM

## 2024-05-22 PROCEDURE — 93000 ELECTROCARDIOGRAM COMPLETE: CPT

## 2024-05-22 PROCEDURE — 36415 COLL VENOUS BLD VENIPUNCTURE: CPT

## 2024-05-22 PROCEDURE — 99396 PREV VISIT EST AGE 40-64: CPT

## 2024-05-22 NOTE — HISTORY OF PRESENT ILLNESS
[de-identified] : 47 year old female with history of iron deficiency anemia, dysmenorrhea presents for annual exam.  pap, stanley next month  canceled colonoscopy, but plans to reschedule.  CT ab/pelvis-normal  diet- home cooking mostly.  fruits/vegs.  1 soda per week.   exercise- treadmill, walking   , 3 children  homemaker  non-smoker

## 2024-05-22 NOTE — ASSESSMENT
[FreeTextEntry1] : //  enlarged thyroid on exam -US thyroid, r/o nodule   Iron deficiency anemia, improving as menses not as heavy -Continue iron tabs as directed  Dysmenorrhea, improved  -Follow-up as per GYN  Healthcare Maintenance -Advise Yearly Skin cancer screening with Dermatologist  -Advise Yearly Eye exam with Ophthalmologist -Advise Yearly Dental exam -Educated of the importance of Healthy diet, such as Mediterranean Diet and Exercise, such as walking >20 minutes a day and increasing gradually as tolerated  Immunizations -Flu vaccine -up to date  -Covid vaccine -up to date  Preventative screening  -advised to get PAP for cervical cancer screening -up to date  -advised to get mammogram for breast cancer screening -up to date  -advised to get colonoscopy for colon cancer screening -referral given

## 2024-05-22 NOTE — HEALTH RISK ASSESSMENT
[Good] : ~his/her~  mood as  good [No falls in past year] : Patient reported no falls in the past year [0] : 2) Feeling down, depressed, or hopeless: Not at all (0) [PHQ-2 Negative - No further assessment needed] : PHQ-2 Negative - No further assessment needed [Housing] : housing [] :  [# Of Children ___] : has [unfilled] children [Fully functional (bathing, dressing, toileting, transferring, walking, feeding)] : Fully functional (bathing, dressing, toileting, transferring, walking, feeding) [Fully functional (using the telephone, shopping, preparing meals, housekeeping, doing laundry, using] : Fully functional and needs no help or supervision to perform IADLs (using the telephone, shopping, preparing meals, housekeeping, doing laundry, using transportation, managing medications and managing finances) [Never] : Never [[id number]] : 0 [Change in mental status noted] : No change in mental status noted [Reports changes in hearing] : Reports no changes in hearing [Reports changes in vision] : Reports no changes in vision [MammogramDate] : 2023 [PapSmearDate] : 2023 [ColonoscopyDate] : never

## 2024-05-28 ENCOUNTER — CLINICAL ADVICE (OUTPATIENT)
Age: 49
End: 2024-05-28

## 2024-05-28 LAB
ALBUMIN SERPL ELPH-MCNC: 4.5 G/DL
ALP BLD-CCNC: 122 U/L
ALT SERPL-CCNC: 17 U/L
AMPHET UR-MCNC: NEGATIVE NG/ML
ANION GAP SERPL CALC-SCNC: 13 MMOL/L
APPEARANCE: CLEAR
AST SERPL-CCNC: 22 U/L
BACTERIA: NEGATIVE /HPF
BARBITURATES UR-MCNC: NEGATIVE NG/ML
BASOPHILS # BLD AUTO: 0.03 K/UL
BASOPHILS NFR BLD AUTO: 0.6 %
BENZODIAZ UR-MCNC: NEGATIVE NG/ML
BILIRUB DIRECT SERPL-MCNC: 0.1 MG/DL
BILIRUB INDIRECT SERPL-MCNC: 0.2 MG/DL
BILIRUB SERPL-MCNC: 0.3 MG/DL
BILIRUBIN URINE: NEGATIVE
BLOOD URINE: ABNORMAL
BUN SERPL-MCNC: 14 MG/DL
CALCIUM SERPL-MCNC: 9.3 MG/DL
CAST: 0 /LPF
CHLORIDE SERPL-SCNC: 100 MMOL/L
CHOLEST SERPL-MCNC: 234 MG/DL
CO2 SERPL-SCNC: 22 MMOL/L
COCAINE METAB.OTHER UR-MCNC: NEGATIVE NG/ML
COLOR: YELLOW
CREAT SERPL-MCNC: 0.7 MG/DL
CREATININE, URINE: 107.5 MG/DL
EGFR: 107 ML/MIN/1.73M2
EOSINOPHIL # BLD AUTO: 0.05 K/UL
EOSINOPHIL NFR BLD AUTO: 1.1 %
EPITHELIAL CELLS: 1 /HPF
ESTIMATED AVERAGE GLUCOSE: 105 MG/DL
FENTANYL, URINE: NEGATIVE NG/ML
FERRITIN SERPL-MCNC: 54 NG/ML
FOLATE SERPL-MCNC: 19.2 NG/ML
GLUCOSE QUALITATIVE U: NEGATIVE MG/DL
GLUCOSE SERPL-MCNC: 87 MG/DL
HBA1C MFR BLD HPLC: 5.3 %
HBV SURFACE AB SER QL: NONREACTIVE
HBV SURFACE AG SER QL: NONREACTIVE
HCT VFR BLD CALC: 37.7 %
HDLC SERPL-MCNC: 70 MG/DL
HGB BLD-MCNC: 12.6 G/DL
IMM GRANULOCYTES NFR BLD AUTO: 0.4 %
IRON SATN MFR SERPL: 16 %
IRON SERPL-MCNC: 50 UG/DL
KETONES URINE: NEGATIVE MG/DL
LDLC SERPL CALC-MCNC: 154 MG/DL
LEUKOCYTE ESTERASE URINE: NEGATIVE
LYMPHOCYTES # BLD AUTO: 1.33 K/UL
LYMPHOCYTES NFR BLD AUTO: 28.4 %
M TB IFN-G BLD-IMP: NEGATIVE
MAN DIFF?: NORMAL
MCHC RBC-ENTMCNC: 28.5 PG
MCHC RBC-ENTMCNC: 33.4 GM/DL
MCV RBC AUTO: 85.3 FL
METHADONE SCREEN, UR: NEGATIVE NG/ML
MEV IGG FLD QL IA: >300 AU/ML
MEV IGG+IGM SER-IMP: POSITIVE
MICROSCOPIC-UA: NORMAL
MONOCYTES # BLD AUTO: 0.33 K/UL
MONOCYTES NFR BLD AUTO: 7 %
NEUTROPHILS # BLD AUTO: 2.93 K/UL
NEUTROPHILS NFR BLD AUTO: 62.5 %
NITRITE URINE: NEGATIVE
NONHDLC SERPL-MCNC: 164 MG/DL
OPIATES UR-MCNC: NEGATIVE NG/ML
OXYCODONE/OXYMORPHONE, URINE: NEGATIVE NG/ML
PCP UR-MCNC: NEGATIVE NG/ML
PH URINE: 6
PH, URINE: 5.6
PLATELET # BLD AUTO: 262 K/UL
POTASSIUM SERPL-SCNC: 4.3 MMOL/L
PROT SERPL-MCNC: 7.8 G/DL
PROTEIN URINE: NEGATIVE MG/DL
QUANTIFERON TB PLUS MITOGEN MINUS NIL: >10 IU/ML
QUANTIFERON TB PLUS NIL: 0.03 IU/ML
QUANTIFERON TB PLUS TB1 MINUS NIL: 0.01 IU/ML
QUANTIFERON TB PLUS TB2 MINUS NIL: 0.01 IU/ML
RBC # BLD: 4.42 M/UL
RBC # FLD: 13 %
RED BLOOD CELLS URINE: 3 /HPF
RUBV IGG FLD-ACNC: 6.8 INDEX
RUBV IGG SER-IMP: POSITIVE
SODIUM SERPL-SCNC: 136 MMOL/L
SPECIFIC GRAVITY URINE: 1.02
THC UR QL: NEGATIVE NG/ML
TIBC SERPL-MCNC: 305 UG/DL
TRIGL SERPL-MCNC: 62 MG/DL
TSH SERPL-ACNC: 1.49 UIU/ML
UIBC SERPL-MCNC: 255 UG/DL
UROBILINOGEN URINE: 0.2 MG/DL
VIT B12 SERPL-MCNC: 353 PG/ML
WBC # FLD AUTO: 4.69 K/UL
WHITE BLOOD CELLS URINE: 0 /HPF

## 2024-07-26 ENCOUNTER — RESULT REVIEW (OUTPATIENT)
Age: 49
End: 2024-07-26

## 2024-07-26 ENCOUNTER — APPOINTMENT (OUTPATIENT)
Dept: MAMMOGRAPHY | Facility: IMAGING CENTER | Age: 49
End: 2024-07-26
Payer: COMMERCIAL

## 2024-07-26 ENCOUNTER — APPOINTMENT (OUTPATIENT)
Dept: ULTRASOUND IMAGING | Facility: IMAGING CENTER | Age: 49
End: 2024-07-26
Payer: COMMERCIAL

## 2024-07-26 PROCEDURE — 76641 ULTRASOUND BREAST COMPLETE: CPT | Mod: 26,50

## 2024-07-26 PROCEDURE — 77067 SCR MAMMO BI INCL CAD: CPT | Mod: 26

## 2024-07-26 PROCEDURE — 77063 BREAST TOMOSYNTHESIS BI: CPT | Mod: 26

## 2024-08-05 ENCOUNTER — OUTPATIENT (OUTPATIENT)
Dept: OUTPATIENT SERVICES | Facility: HOSPITAL | Age: 49
LOS: 1 days | End: 2024-08-05

## 2024-08-05 VITALS
OXYGEN SATURATION: 100 % | HEART RATE: 78 BPM | DIASTOLIC BLOOD PRESSURE: 82 MMHG | SYSTOLIC BLOOD PRESSURE: 134 MMHG | TEMPERATURE: 98 F | WEIGHT: 138.01 LBS | HEIGHT: 63.5 IN | RESPIRATION RATE: 16 BRPM

## 2024-08-05 DIAGNOSIS — N92.1 EXCESSIVE AND FREQUENT MENSTRUATION WITH IRREGULAR CYCLE: ICD-10-CM

## 2024-08-05 DIAGNOSIS — Z98.51 TUBAL LIGATION STATUS: Chronic | ICD-10-CM

## 2024-08-05 LAB
HCG SERPL-ACNC: <1 MIU/ML — SIGNIFICANT CHANGE UP
HCT VFR BLD CALC: 33.8 % — LOW (ref 34.5–45)
HGB BLD-MCNC: 10.4 G/DL — LOW (ref 11.5–15.5)
MCHC RBC-ENTMCNC: 24.1 PG — LOW (ref 27–34)
MCHC RBC-ENTMCNC: 30.8 GM/DL — LOW (ref 32–36)
MCV RBC AUTO: 78.2 FL — LOW (ref 80–100)
NRBC # BLD: 0 /100 WBCS — SIGNIFICANT CHANGE UP (ref 0–0)
NRBC # FLD: 0 K/UL — SIGNIFICANT CHANGE UP (ref 0–0)
PLATELET # BLD AUTO: 307 K/UL — SIGNIFICANT CHANGE UP (ref 150–400)
RBC # BLD: 4.32 M/UL — SIGNIFICANT CHANGE UP (ref 3.8–5.2)
RBC # FLD: 14.6 % — HIGH (ref 10.3–14.5)
WBC # BLD: 5.77 K/UL — SIGNIFICANT CHANGE UP (ref 3.8–10.5)
WBC # FLD AUTO: 5.77 K/UL — SIGNIFICANT CHANGE UP (ref 3.8–10.5)

## 2024-08-05 NOTE — H&P PST ADULT - HISTORY OF PRESENT ILLNESS
48 yr old female with PMH of FAUSTO  presents to PST for preop evaluation. Pt stated she has had Menorrhagia and large clots for many months and gyn ultrasound showed endometrial hyperplasia. Patient is scheduled for Dilation and curettage hysteroscopy with myosure on 8/13/2024.

## 2024-08-05 NOTE — H&P PST ADULT - PROBLEM SELECTOR PLAN 1
Patient is scheduled for Dilation and curettage hysteroscopy with myosure on 8/13/2024. Preop teaching done, written and verbal instructions given, with teach back, pt able to verbalize understanding. Pt will take own Pepcid on the morning of procedure for GI prophylaxis.     CBC and serum HCG done at PST.

## 2024-08-05 NOTE — H&P PST ADULT - NSICDXPASTMEDICALHX_GEN_ALL_CORE_FT
PAST MEDICAL HISTORY:  Anemia     Endometrial hyperplasia, unspecified     Excessive and frequent menstruation with irregular cycle     Heavy menstrual bleeding     Short cervix affecting pregnancy treated with progesterone until week 34 of this pregnancy    Tubal ligation status bilateral tubal ligation 2014

## 2024-08-05 NOTE — H&P PST ADULT - NEUROLOGICAL
cranial nerves II-XII intact/sensation intact/cranial nerves intact/no spontaneous movement negative

## 2024-08-12 ENCOUNTER — TRANSCRIPTION ENCOUNTER (OUTPATIENT)
Age: 49
End: 2024-08-12

## 2024-08-13 ENCOUNTER — TRANSCRIPTION ENCOUNTER (OUTPATIENT)
Age: 49
End: 2024-08-13

## 2024-08-13 ENCOUNTER — RESULT REVIEW (OUTPATIENT)
Age: 49
End: 2024-08-13

## 2024-08-13 RX ORDER — NORETHINDRONE 0.35 MG/1
1 TABLET ORAL
Refills: 0 | DISCHARGE

## 2024-08-20 ENCOUNTER — OUTPATIENT (OUTPATIENT)
Dept: OUTPATIENT SERVICES | Facility: HOSPITAL | Age: 49
LOS: 1 days | End: 2024-08-20
Payer: COMMERCIAL

## 2024-08-20 ENCOUNTER — APPOINTMENT (OUTPATIENT)
Dept: ULTRASOUND IMAGING | Facility: IMAGING CENTER | Age: 49
End: 2024-08-20

## 2024-08-20 DIAGNOSIS — Z98.51 TUBAL LIGATION STATUS: Chronic | ICD-10-CM

## 2024-08-20 DIAGNOSIS — Z00.8 ENCOUNTER FOR OTHER GENERAL EXAMINATION: ICD-10-CM

## 2024-08-20 DIAGNOSIS — E04.9 NONTOXIC GOITER, UNSPECIFIED: ICD-10-CM

## 2024-08-20 PROBLEM — N92.1 EXCESSIVE AND FREQUENT MENSTRUATION WITH IRREGULAR CYCLE: Chronic | Status: ACTIVE | Noted: 2024-08-05

## 2024-08-20 PROBLEM — N85.00 ENDOMETRIAL HYPERPLASIA, UNSPECIFIED: Chronic | Status: ACTIVE | Noted: 2024-08-05

## 2024-08-20 PROCEDURE — 76536 US EXAM OF HEAD AND NECK: CPT

## 2024-08-20 PROCEDURE — 76536 US EXAM OF HEAD AND NECK: CPT | Mod: 26

## 2024-09-03 ENCOUNTER — CLINICAL ADVICE (OUTPATIENT)
Age: 49
End: 2024-09-03

## 2025-05-26 ENCOUNTER — NON-APPOINTMENT (OUTPATIENT)
Age: 50
End: 2025-05-26

## 2025-05-27 ENCOUNTER — NON-APPOINTMENT (OUTPATIENT)
Age: 50
End: 2025-05-27

## 2025-05-28 ENCOUNTER — APPOINTMENT (OUTPATIENT)
Dept: INTERNAL MEDICINE | Facility: CLINIC | Age: 50
End: 2025-05-28
Payer: COMMERCIAL

## 2025-05-28 ENCOUNTER — NON-APPOINTMENT (OUTPATIENT)
Age: 50
End: 2025-05-28

## 2025-05-28 ENCOUNTER — LABORATORY RESULT (OUTPATIENT)
Age: 50
End: 2025-05-28

## 2025-05-28 VITALS
WEIGHT: 137 LBS | HEART RATE: 82 BPM | DIASTOLIC BLOOD PRESSURE: 89 MMHG | SYSTOLIC BLOOD PRESSURE: 139 MMHG | OXYGEN SATURATION: 100 % | BODY MASS INDEX: 25.21 KG/M2 | HEIGHT: 62 IN

## 2025-05-28 DIAGNOSIS — Z00.00 ENCOUNTER FOR GENERAL ADULT MEDICAL EXAMINATION W/OUT ABNORMAL FINDINGS: ICD-10-CM

## 2025-05-28 DIAGNOSIS — D50.9 IRON DEFICIENCY ANEMIA, UNSPECIFIED: ICD-10-CM

## 2025-05-28 DIAGNOSIS — Z78.9 OTHER SPECIFIED HEALTH STATUS: ICD-10-CM

## 2025-05-28 PROCEDURE — 99396 PREV VISIT EST AGE 40-64: CPT

## 2025-05-28 PROCEDURE — 36415 COLL VENOUS BLD VENIPUNCTURE: CPT

## 2025-05-28 PROCEDURE — 93000 ELECTROCARDIOGRAM COMPLETE: CPT

## 2025-05-28 RX ORDER — UBIDECARENONE/VIT E ACET 100MG-5
1000 CAPSULE ORAL
Refills: 0 | Status: ACTIVE | COMMUNITY

## 2025-05-29 ENCOUNTER — CLINICAL ADVICE (OUTPATIENT)
Age: 50
End: 2025-05-29

## 2025-05-29 LAB
ALBUMIN SERPL ELPH-MCNC: 4.7 G/DL
ALP BLD-CCNC: 130 U/L
ALT SERPL-CCNC: 23 U/L
ANION GAP SERPL CALC-SCNC: 14 MMOL/L
APPEARANCE: CLEAR
AST SERPL-CCNC: 25 U/L
BACTERIA: NEGATIVE /HPF
BASOPHILS # BLD AUTO: 0.02 K/UL
BASOPHILS NFR BLD AUTO: 0.6 %
BILIRUB DIRECT SERPL-MCNC: 0.13 MG/DL
BILIRUB INDIRECT SERPL-MCNC: 0.2 MG/DL
BILIRUB SERPL-MCNC: 0.3 MG/DL
BILIRUBIN URINE: NEGATIVE
BLOOD URINE: NEGATIVE
BUN SERPL-MCNC: 12 MG/DL
CALCIUM SERPL-MCNC: 9.9 MG/DL
CAST: 0 /LPF
CHLORIDE SERPL-SCNC: 101 MMOL/L
CHOLEST SERPL-MCNC: 252 MG/DL
CO2 SERPL-SCNC: 24 MMOL/L
COLOR: YELLOW
CREAT SERPL-MCNC: 0.77 MG/DL
EGFRCR SERPLBLD CKD-EPI 2021: 95 ML/MIN/1.73M2
EOSINOPHIL # BLD AUTO: 0.06 K/UL
EOSINOPHIL NFR BLD AUTO: 1.7 %
EPITHELIAL CELLS: 3 /HPF
ESTIMATED AVERAGE GLUCOSE: 103 MG/DL
FERRITIN SERPL-MCNC: 37 NG/ML
FOLATE SERPL-MCNC: >20 NG/ML
GLUCOSE QUALITATIVE U: NEGATIVE MG/DL
GLUCOSE SERPL-MCNC: 92 MG/DL
HBA1C MFR BLD HPLC: 5.2 %
HCT VFR BLD CALC: 38.3 %
HDLC SERPL-MCNC: 77 MG/DL
HGB BLD-MCNC: 12.4 G/DL
IMM GRANULOCYTES NFR BLD AUTO: 0.3 %
IRON SATN MFR SERPL: 11 %
IRON SERPL-MCNC: 34 UG/DL
KETONES URINE: NEGATIVE MG/DL
LDLC SERPL-MCNC: 166 MG/DL
LEUKOCYTE ESTERASE URINE: NEGATIVE
LYMPHOCYTES # BLD AUTO: 1.13 K/UL
LYMPHOCYTES NFR BLD AUTO: 32.1 %
MAN DIFF?: NORMAL
MCHC RBC-ENTMCNC: 28 PG
MCHC RBC-ENTMCNC: 32.4 G/DL
MCV RBC AUTO: 86.5 FL
MEV IGG FLD QL IA: >300 AU/ML
MEV IGG+IGM SER-IMP: POSITIVE
MICROSCOPIC-UA: NORMAL
MONOCYTES # BLD AUTO: 0.3 K/UL
MONOCYTES NFR BLD AUTO: 8.5 %
MUV AB SER-ACNC: POSITIVE
MUV IGG SER QL IA: 25.1 AU/ML
NEUTROPHILS # BLD AUTO: 2 K/UL
NEUTROPHILS NFR BLD AUTO: 56.8 %
NITRITE URINE: NEGATIVE
NONHDLC SERPL-MCNC: 175 MG/DL
PH URINE: 6.5
PLATELET # BLD AUTO: 254 K/UL
POTASSIUM SERPL-SCNC: 4.2 MMOL/L
PROT SERPL-MCNC: 8.1 G/DL
PROTEIN URINE: NEGATIVE MG/DL
RBC # BLD: 4.43 M/UL
RBC # FLD: 12.8 %
RED BLOOD CELLS URINE: 4 /HPF
RUBV IGG FLD-ACNC: 7.12 INDEX
RUBV IGG SER-IMP: POSITIVE
SODIUM SERPL-SCNC: 139 MMOL/L
SPECIFIC GRAVITY URINE: <1.005
TIBC SERPL-MCNC: 320 UG/DL
TRIGL SERPL-MCNC: 56 MG/DL
TSH SERPL-ACNC: 2.37 UIU/ML
UIBC SERPL-MCNC: 285 UG/DL
UROBILINOGEN URINE: 0.2 MG/DL
VIT B12 SERPL-MCNC: 430 PG/ML
VZV AB TITR SER: POSITIVE
VZV IGG SER IF-ACNC: 3.57 S/CO
WBC # FLD AUTO: 3.52 K/UL
WHITE BLOOD CELLS URINE: 0 /HPF

## 2025-05-30 ENCOUNTER — CLINICAL ADVICE (OUTPATIENT)
Age: 50
End: 2025-05-30

## 2025-05-30 LAB
M TB IFN-G BLD-IMP: NEGATIVE
QUANTIFERON TB PLUS MITOGEN MINUS NIL: >10 IU/ML
QUANTIFERON TB PLUS NIL: 0.04 IU/ML
QUANTIFERON TB PLUS TB1 MINUS NIL: 0 IU/ML
QUANTIFERON TB PLUS TB2 MINUS NIL: 0.01 IU/ML

## 2025-07-25 ENCOUNTER — RESULT REVIEW (OUTPATIENT)
Age: 50
End: 2025-07-25

## 2025-08-28 ENCOUNTER — APPOINTMENT (OUTPATIENT)
Dept: MAMMOGRAPHY | Facility: IMAGING CENTER | Age: 50
End: 2025-08-28
Payer: COMMERCIAL

## 2025-08-28 ENCOUNTER — RESULT REVIEW (OUTPATIENT)
Age: 50
End: 2025-08-28

## 2025-08-28 ENCOUNTER — APPOINTMENT (OUTPATIENT)
Dept: ULTRASOUND IMAGING | Facility: IMAGING CENTER | Age: 50
End: 2025-08-28
Payer: COMMERCIAL

## 2025-08-28 ENCOUNTER — OUTPATIENT (OUTPATIENT)
Dept: OUTPATIENT SERVICES | Facility: HOSPITAL | Age: 50
LOS: 1 days | End: 2025-08-28
Payer: COMMERCIAL

## 2025-08-28 DIAGNOSIS — Z00.8 ENCOUNTER FOR OTHER GENERAL EXAMINATION: ICD-10-CM

## 2025-08-28 DIAGNOSIS — Z98.51 TUBAL LIGATION STATUS: Chronic | ICD-10-CM

## 2025-08-28 PROCEDURE — 76641 ULTRASOUND BREAST COMPLETE: CPT | Mod: 26,50

## 2025-08-28 PROCEDURE — 77063 BREAST TOMOSYNTHESIS BI: CPT

## 2025-08-28 PROCEDURE — 77067 SCR MAMMO BI INCL CAD: CPT | Mod: 26

## 2025-08-28 PROCEDURE — 77067 SCR MAMMO BI INCL CAD: CPT

## 2025-08-28 PROCEDURE — 76641 ULTRASOUND BREAST COMPLETE: CPT

## 2025-08-28 PROCEDURE — 77063 BREAST TOMOSYNTHESIS BI: CPT | Mod: 26
